# Patient Record
Sex: MALE | Race: BLACK OR AFRICAN AMERICAN | NOT HISPANIC OR LATINO | Employment: FULL TIME | RURAL
[De-identification: names, ages, dates, MRNs, and addresses within clinical notes are randomized per-mention and may not be internally consistent; named-entity substitution may affect disease eponyms.]

---

## 2017-11-21 ENCOUNTER — OFFICE VISIT (OUTPATIENT)
Dept: FAMILY MEDICINE CLINIC | Facility: CLINIC | Age: 42
End: 2017-11-21

## 2017-11-21 VITALS
DIASTOLIC BLOOD PRESSURE: 92 MMHG | HEART RATE: 93 BPM | TEMPERATURE: 98.6 F | BODY MASS INDEX: 26.69 KG/M2 | WEIGHT: 208 LBS | OXYGEN SATURATION: 97 % | HEIGHT: 74 IN | SYSTOLIC BLOOD PRESSURE: 158 MMHG | RESPIRATION RATE: 20 BRPM

## 2017-11-21 DIAGNOSIS — R03.0 ELEVATED BLOOD PRESSURE READING: Primary | ICD-10-CM

## 2017-11-21 DIAGNOSIS — Z80.0 FAMILY HISTORY OF COLON CANCER: ICD-10-CM

## 2017-11-21 PROCEDURE — 80053 COMPREHEN METABOLIC PANEL: CPT | Performed by: NURSE PRACTITIONER

## 2017-11-21 PROCEDURE — 36415 COLL VENOUS BLD VENIPUNCTURE: CPT | Performed by: NURSE PRACTITIONER

## 2017-11-21 PROCEDURE — 80061 LIPID PANEL: CPT | Performed by: NURSE PRACTITIONER

## 2017-11-21 PROCEDURE — 99213 OFFICE O/P EST LOW 20 MIN: CPT | Performed by: NURSE PRACTITIONER

## 2017-11-21 NOTE — PROGRESS NOTES
Subjective   Dylan Dickinson is a 42 y.o. male.     HPI Comments: Brought in by his wife to establish.  She has noted that occ his b/p will elevate.  So when he eats salty foods.  There is a family hx of heart disease and his mother had colon cancer.  He takes no meds and has not had labs drawn in some time.    Hypertension   This is a recurrent problem. The current episode started more than 1 month ago. The problem has been waxing and waning since onset. Pertinent negatives include no anxiety, blurred vision, chest pain, headaches, malaise/fatigue, neck pain, orthopnea, palpitations, peripheral edema, PND, shortness of breath or sweats. There are no associated agents to hypertension. Risk factors for coronary artery disease include family history, male gender and smoking/tobacco exposure. Past treatments include nothing. There is no history of angina, kidney disease, CAD/MI, CVA, heart failure, left ventricular hypertrophy, PVD or retinopathy.        The following portions of the patient's history were reviewed and updated as appropriate: allergies, current medications, past family history, past medical history, past social history, past surgical history and problem list.    Review of Systems   Constitutional: Negative.  Negative for malaise/fatigue.   HENT: Negative.    Eyes: Negative for blurred vision.   Respiratory: Negative.  Negative for shortness of breath.    Cardiovascular: Negative.  Negative for chest pain, palpitations, orthopnea and PND.   Musculoskeletal: Negative.  Negative for neck pain.   Skin: Negative.    Neurological: Negative.  Negative for headaches.   Psychiatric/Behavioral: Negative.        Objective   Physical Exam   Constitutional: He is oriented to person, place, and time. He appears well-developed and well-nourished. No distress.   HENT:   Head: Normocephalic.   Eyes: Pupils are equal, round, and reactive to light.   Neck: Normal range of motion. Neck supple. No thyromegaly present.    Cardiovascular: Normal rate, regular rhythm and normal heart sounds.  Exam reveals no friction rub.    No murmur heard.  Pulmonary/Chest: Effort normal and breath sounds normal. No respiratory distress. He has no wheezes. He has no rales.   Abdominal: Soft.   Musculoskeletal: Normal range of motion.   Neurological: He is alert and oriented to person, place, and time.   Skin: Skin is warm and dry.   Psychiatric: He has a normal mood and affect. Thought content normal.   Nursing note and vitals reviewed.      Assessment/Plan   Dylan was seen today for annual exam.    Diagnoses and all orders for this visit:    Elevated blood pressure reading  -     Comprehensive Metabolic Panel  -     Lipid panel    Family history of colon cancer  -     Ambulatory Referral to Gastroenterology    at present will just watch his diet.  They will monitor his b/p at home.  Referral and labs to be done.

## 2017-11-22 LAB
ALBUMIN SERPL-MCNC: 4.8 G/DL (ref 3.4–4.8)
ALBUMIN/GLOB SERPL: 1.7 G/DL (ref 1.1–1.8)
ALP SERPL-CCNC: 52 U/L (ref 38–126)
ALT SERPL W P-5'-P-CCNC: 30 U/L (ref 21–72)
ANION GAP SERPL CALCULATED.3IONS-SCNC: 14 MMOL/L (ref 5–15)
ARTICHOKE IGE QN: 163 MG/DL (ref 1–129)
AST SERPL-CCNC: 24 U/L (ref 17–59)
BILIRUB SERPL-MCNC: 0.5 MG/DL (ref 0.2–1.3)
BUN BLD-MCNC: 25 MG/DL (ref 7–21)
BUN/CREAT SERPL: 16.4 (ref 7–25)
CALCIUM SPEC-SCNC: 9.2 MG/DL (ref 8.4–10.2)
CHLORIDE SERPL-SCNC: 103 MMOL/L (ref 95–110)
CHOLEST SERPL-MCNC: 241 MG/DL (ref 0–199)
CO2 SERPL-SCNC: 21 MMOL/L (ref 22–31)
CREAT BLD-MCNC: 1.52 MG/DL (ref 0.7–1.3)
GFR SERPL CREATININE-BSD FRML MDRD: 62 ML/MIN/1.73 (ref 63–147)
GLOBULIN UR ELPH-MCNC: 2.9 GM/DL (ref 2.3–3.5)
GLUCOSE BLD-MCNC: 83 MG/DL (ref 60–100)
HDLC SERPL-MCNC: 57 MG/DL (ref 60–200)
LDLC/HDLC SERPL: 2.88 {RATIO} (ref 0–3.55)
POTASSIUM BLD-SCNC: 4.6 MMOL/L (ref 3.5–5.1)
PROT SERPL-MCNC: 7.7 G/DL (ref 6.3–8.6)
SODIUM BLD-SCNC: 138 MMOL/L (ref 137–145)
TRIGL SERPL-MCNC: 99 MG/DL (ref 20–199)

## 2017-11-27 ENCOUNTER — TELEPHONE (OUTPATIENT)
Dept: FAMILY MEDICINE CLINIC | Facility: CLINIC | Age: 42
End: 2017-11-27

## 2017-11-27 DIAGNOSIS — E78.2 MIXED HYPERLIPIDEMIA: Primary | ICD-10-CM

## 2017-12-12 RX ORDER — ATORVASTATIN CALCIUM 20 MG/1
20 TABLET, FILM COATED ORAL DAILY
Qty: 30 TABLET | Refills: 3 | Status: SHIPPED | OUTPATIENT
Start: 2017-12-12 | End: 2018-01-25 | Stop reason: SDUPTHER

## 2017-12-12 NOTE — TELEPHONE ENCOUNTER
Patient is aware of results and needs to stop any NSAID's and recheck his levels in 6 weeks.  Also aware needs to start Lipitor 20 mg for cholesterol and script sent to [facility] and regular f/u appt in 3 months

## 2018-01-22 ENCOUNTER — LAB (OUTPATIENT)
Dept: LAB | Facility: CLINIC | Age: 43
End: 2018-01-22

## 2018-01-22 DIAGNOSIS — E78.2 MIXED HYPERLIPIDEMIA: ICD-10-CM

## 2018-01-22 PROCEDURE — 80053 COMPREHEN METABOLIC PANEL: CPT | Performed by: NURSE PRACTITIONER

## 2018-01-22 PROCEDURE — 80061 LIPID PANEL: CPT | Performed by: NURSE PRACTITIONER

## 2018-01-22 PROCEDURE — 36415 COLL VENOUS BLD VENIPUNCTURE: CPT | Performed by: NURSE PRACTITIONER

## 2018-01-23 LAB
ALBUMIN SERPL-MCNC: 4.4 G/DL (ref 3.4–4.8)
ALBUMIN/GLOB SERPL: 1.6 G/DL (ref 1.1–1.8)
ALP SERPL-CCNC: 48 U/L (ref 38–126)
ALT SERPL W P-5'-P-CCNC: 30 U/L (ref 21–72)
ANION GAP SERPL CALCULATED.3IONS-SCNC: 10 MMOL/L (ref 5–15)
ARTICHOKE IGE QN: 160 MG/DL (ref 1–129)
AST SERPL-CCNC: 22 U/L (ref 17–59)
BILIRUB SERPL-MCNC: 0.9 MG/DL (ref 0.2–1.3)
BUN BLD-MCNC: 19 MG/DL (ref 7–21)
BUN/CREAT SERPL: 15.6 (ref 7–25)
CALCIUM SPEC-SCNC: 9.3 MG/DL (ref 8.4–10.2)
CHLORIDE SERPL-SCNC: 103 MMOL/L (ref 95–110)
CHOLEST SERPL-MCNC: 247 MG/DL (ref 0–199)
CO2 SERPL-SCNC: 23 MMOL/L (ref 22–31)
CREAT BLD-MCNC: 1.22 MG/DL (ref 0.7–1.3)
GFR SERPL CREATININE-BSD FRML MDRD: 80 ML/MIN/1.73 (ref 63–147)
GLOBULIN UR ELPH-MCNC: 2.8 GM/DL (ref 2.3–3.5)
GLUCOSE BLD-MCNC: 79 MG/DL (ref 60–100)
HDLC SERPL-MCNC: 61 MG/DL (ref 60–200)
LDLC/HDLC SERPL: 2.78 {RATIO} (ref 0–3.55)
POTASSIUM BLD-SCNC: 4.7 MMOL/L (ref 3.5–5.1)
PROT SERPL-MCNC: 7.2 G/DL (ref 6.3–8.6)
SODIUM BLD-SCNC: 136 MMOL/L (ref 137–145)
TRIGL SERPL-MCNC: 81 MG/DL (ref 20–199)

## 2018-01-25 ENCOUNTER — TELEPHONE (OUTPATIENT)
Dept: FAMILY MEDICINE CLINIC | Facility: CLINIC | Age: 43
End: 2018-01-25

## 2018-01-25 DIAGNOSIS — E78.2 MIXED HYPERLIPIDEMIA: ICD-10-CM

## 2018-01-25 RX ORDER — ATORVASTATIN CALCIUM 20 MG/1
20 TABLET, FILM COATED ORAL DAILY
Qty: 30 TABLET | Refills: 3 | Status: SHIPPED | OUTPATIENT
Start: 2018-01-25 | End: 2018-02-08 | Stop reason: SDUPTHER

## 2018-01-25 NOTE — TELEPHONE ENCOUNTER
Patient aware never started taking the atrovastatin.  Sent in new script and he will start taking.

## 2018-01-25 NOTE — TELEPHONE ENCOUNTER
----- Message from SHABANA Castellano sent at 1/25/2018  8:00 AM CST -----  Atorvastatin needs to be increased to 40mg and repeat in 6 months

## 2018-02-08 DIAGNOSIS — E78.2 MIXED HYPERLIPIDEMIA: ICD-10-CM

## 2018-02-08 RX ORDER — ATORVASTATIN CALCIUM 20 MG/1
20 TABLET, FILM COATED ORAL DAILY
Qty: 90 TABLET | Refills: 1 | Status: SHIPPED | OUTPATIENT
Start: 2018-02-08

## 2020-03-03 ENCOUNTER — OFFICE VISIT (OUTPATIENT)
Dept: FAMILY MEDICINE CLINIC | Facility: CLINIC | Age: 45
End: 2020-03-03

## 2020-03-03 VITALS
RESPIRATION RATE: 20 BRPM | DIASTOLIC BLOOD PRESSURE: 88 MMHG | HEART RATE: 67 BPM | BODY MASS INDEX: 23.81 KG/M2 | OXYGEN SATURATION: 98 % | HEIGHT: 74 IN | WEIGHT: 185.5 LBS | SYSTOLIC BLOOD PRESSURE: 118 MMHG | TEMPERATURE: 98.2 F

## 2020-03-03 DIAGNOSIS — M26.621 TMJ TENDERNESS, RIGHT: Primary | ICD-10-CM

## 2020-03-03 PROCEDURE — 99214 OFFICE O/P EST MOD 30 MIN: CPT | Performed by: NURSE PRACTITIONER

## 2020-03-03 PROCEDURE — 96372 THER/PROPH/DIAG INJ SC/IM: CPT | Performed by: NURSE PRACTITIONER

## 2020-03-03 RX ORDER — PANTOPRAZOLE SODIUM 40 MG/1
TABLET, DELAYED RELEASE ORAL
COMMUNITY
Start: 2020-02-05

## 2020-03-03 RX ORDER — IBUPROFEN 800 MG/1
800 TABLET ORAL EVERY 8 HOURS PRN
Qty: 30 TABLET | Refills: 0 | Status: SHIPPED | OUTPATIENT
Start: 2020-03-03 | End: 2020-03-13

## 2020-03-03 RX ORDER — KETOROLAC TROMETHAMINE 30 MG/ML
60 INJECTION, SOLUTION INTRAMUSCULAR; INTRAVENOUS ONCE
Status: COMPLETED | OUTPATIENT
Start: 2020-03-03 | End: 2020-03-03

## 2020-03-03 RX ORDER — KETOROLAC TROMETHAMINE 30 MG/ML
INJECTION, SOLUTION INTRAMUSCULAR; INTRAVENOUS
Status: CANCELLED | OUTPATIENT
Start: 2020-03-03

## 2020-03-03 RX ADMIN — KETOROLAC TROMETHAMINE 60 MG: 30 INJECTION, SOLUTION INTRAMUSCULAR; INTRAVENOUS at 11:00

## 2020-03-03 NOTE — PROGRESS NOTES
Chief Complaint   Patient presents with   • Earache   • Jaw Pain       Subjective:  Dylan Dickinson is a 45 y.o. male who presents for right ear and jaw pain that started yesterday. Denies ear drainage or known dental problems. Denies fever. Reports pain level 5/10 today. Has not tried tylenol or ibuprofen for pain control.       The following portions of the patient's history were reviewed and updated as appropriate: allergies, current medications, past family history, past medical history, past social history, past surgical history and problem list.    Earache    There is pain in the right ear. This is a new problem. The current episode started yesterday. The problem occurs constantly. The problem has been unchanged. There has been no fever. The pain is at a severity of 5/10. The pain is moderate. Pertinent negatives include no coughing, ear discharge, headaches, neck pain, rhinorrhea or sore throat. He has tried nothing for the symptoms. There is no history of a chronic ear infection.   Jaw Pain   This is a new problem. The current episode started yesterday. The problem occurs constantly. The problem has been unchanged. Associated symptoms include arthralgias. Pertinent negatives include no congestion, coughing, fever, headaches, myalgias, neck pain or sore throat. The symptoms are aggravated by eating (any movement that requires opening mouth). He has tried nothing for the symptoms.        History reviewed. No pertinent past medical history.      Current Outpatient Medications:   •  pantoprazole (PROTONIX) 40 MG EC tablet, , Disp: , Rfl:   •  atorvastatin (LIPITOR) 20 MG tablet, Take 1 tablet by mouth Daily., Disp: 90 tablet, Rfl: 1  •  ibuprofen (ADVIL,MOTRIN) 800 MG tablet, Take 1 tablet by mouth Every 8 (Eight) Hours As Needed for Mild Pain  or Moderate Pain  for up to 10 days., Disp: 30 tablet, Rfl: 0  No current facility-administered medications for this visit.     Review of Systems    Review of Systems  "  Constitutional: Negative for fever.   HENT: Positive for ear pain. Negative for congestion, ear discharge, rhinorrhea, sinus pressure, sinus pain, sore throat and trouble swallowing.    Respiratory: Negative for cough.    Musculoskeletal: Positive for arthralgias. Negative for myalgias and neck pain.        Right jaw pain   Neurological: Negative for headaches.       Objective  Vitals:    03/03/20 1019   BP: 118/88   BP Location: Left arm   Patient Position: Sitting   Cuff Size: Large Adult   Pulse: 67   Resp: 20   Temp: 98.2 °F (36.8 °C)   TempSrc: Oral   SpO2: 98%   Weight: 84.1 kg (185 lb 8 oz)   Height: 188 cm (74\")   PainSc:   5       Physical Exam   Constitutional: He is oriented to person, place, and time. He appears well-developed and well-nourished.   HENT:   Head: Normocephalic and atraumatic.   Right Ear: External ear normal.   Left Ear: External ear normal.   Nose: Nose normal.   Mouth/Throat: Oropharynx is clear and moist.   Eyes: Pupils are equal, round, and reactive to light.   Cardiovascular: Normal rate, regular rhythm and normal heart sounds.   Pulmonary/Chest: Effort normal and breath sounds normal.   Musculoskeletal: He exhibits tenderness (R TMJ area ttp; pain with opening mouth and clenching teeth).   Lymphadenopathy:     He has no cervical adenopathy.   Neurological: He is alert and oriented to person, place, and time.   Psychiatric: He has a normal mood and affect. His behavior is normal.   Nursing note and vitals reviewed.        Dylan was seen today for earache and jaw pain.    Diagnoses and all orders for this visit:    TMJ tenderness, right  -     ketorolac (TORADOL) injection 60 mg  -     ibuprofen (ADVIL,MOTRIN) 800 MG tablet; Take 1 tablet by mouth Every 8 (Eight) Hours As Needed for Mild Pain  or Moderate Pain  for up to 10 days.    1. Exam reassuring for R TMJ dysfunction.  2. Discussed using bite block at night if teeth grinding.  3. IM Ketorolac given today for " pain/inflammation  4. RX for ibuprofen submitted. Advised to take with food.  5. Advised f/u with PCP for continued sx or RTC PRN.      This document has been electronically signed by SHABANA Vazquez on March 3, 2020 11:05 AM

## 2020-03-03 NOTE — PATIENT INSTRUCTIONS
Jaw Range of Motion Exercises  Jaw range of motion exercises are exercises that help your jaw move better. Exercises that help you have good posture (postural exercises) also help relieve jaw discomfort. These are often done along with range of motion exercises. These exercises can help prevent or improve:  · Difficulty opening your mouth.  · Pain in your jaw while it is open or closed.  · Temporomandibular joint (TMJ) pain.  · Headache caused by jaw tension.  Take other actions to prevent or relieve jaw pain, such as:  · Avoiding things that cause or increase jaw pain. This may include:  ? Chewing gum or eating hard foods.  ? Clenching your jaw or teeth, grinding your teeth, or keeping tension in your jaw muscles.  ? Opening your mouth wide, such as for a big yawn.  ? Leaning on your jaw, such as resting your jaw in your hand while leaning on a desk.  · Putting ice on your jaw.  ? Put ice in a plastic bag.  ? Place a towel between your skin and the bag.  ? Leave the ice on for 10-15 minutes, 2-3 times a day.  Only do jaw exercises that your health care provider approves of. Only move your jaw as far as it can comfortably go in each direction. Do not move your jaw into positions that cause pain.  Range of motion exercises  Repeat each of these exercises 8 times, 1-2 times a day, or as told by your health care provider.  Exercise A: Forward protrusion  1. Push your jaw forward. Hold this position for 1-2 seconds.  2. Allow your jaw to return to its normal position and rest it there for 1-2 seconds.  Exercise B: Controlled opening  1. Stand or sit in front of a mirror. Place your tongue on the roof of your mouth, just behind your top teeth.  2. Keeping your tongue on the roof of your mouth, slowly open and close your mouth.  3. While you open and close your mouth, watch your jaw in the mirror. Try to keep your jaw from moving to one side or the other.  Exercise C: Right and left motion  1. Move your jaw right. Hold  "this position for 1-2 seconds. Allow your jaw to return to its normal position, and rest it there for 1-2 seconds.  2. Move your jaw left. Hold this position for 1-2 seconds. Allow your jaw to return to its normal position, and rest it there for 1-2 seconds.  Postural exercises  Exercise A: Chin tucks  1. You can do this exercise sitting, standing, or lying down.  2. Move your head straight back, keeping your head level. You can guide the movement by placing your fingers on your chin to push your jaw back in an even motion. You should be able to feel a double chin form at the end of the motion.  3. Hold this position for 5 seconds. Repeat 10-15 times.  Exercise B: Shoulder blade squeeze  1. Sit or stand.  2. Bend your elbows to about 90 degrees, which is the shape of a capital letter \"L.\" Keep your upper arms by your body.  3. Squeeze your shoulder blades down and back, as though you were trying to touch your elbows behind you. Do not shrug your shoulders or move your head.  4. Hold this position for 5 seconds. Repeat 10-15 times.  Exercise C: Chest stretch  1. Stand facing a corner.  2. Put both of your hands and your forearms on the wall, with your arms wide apart.  3. Make sure your arms are at a 90-degree angle to your body. This means that you should hold your arms straight out from your body, level with the floor.  4. Step in toward the corner. Do not lean in.  5. Hold this position for 30 seconds. Repeat 3 times.  Contact a health care provider if you have:  · Jaw pain that is new or gets worse.  · Clicking or popping sounds while doing the exercises.  Get help right away if:  · Your jaw is stuck in one place and you cannot move it.  · You cannot open or close your mouth.  This information is not intended to replace advice given to you by your health care provider. Make sure you discuss any questions you have with your health care provider.  Document Released: 11/30/2009 Document Revised: 11/14/2018 Document " Reviewed: 11/14/2018  [employer] Interactive Patient Education © 2020 [employer]  Temporomandibular Joint Syndrome    Temporomandibular joint syndrome (TMJ syndrome) is a condition that causes pain in the temporomandibular joints. These joints are located near your ears and allow your jaw to open and close. For people with TMJ syndrome, chewing, biting, or other movements of the jaw can be difficult or painful.  TMJ syndrome is often mild and goes away within a few weeks. However, sometimes the condition becomes a long-term (chronic) problem.  What are the causes?  This condition may be caused by:  · Grinding your teeth or clenching your jaw. Some people do this when they are under stress.  · Arthritis.  · Injury to the jaw.  · Head or neck injury.  · Teeth or dentures that are not aligned well.  In some cases, the cause of TMJ syndrome may not be known.  What are the signs or symptoms?  The most common symptom of this condition is an aching pain on the side of the head in the area of the TMJ. Other symptoms may include:  · Pain when moving your jaw, such as when chewing or biting.  · Being unable to open your jaw all the way.  · Making a clicking sound when you open your mouth.  · Headache.  · Earache.  · Neck or shoulder pain.  How is this diagnosed?  This condition may be diagnosed based on:  · Your symptoms and medical history.  · A physical exam. Your health care provider may check the range of motion of your jaw.  · Imaging tests, such as X-rays or an MRI.  You may also need to see your dentist, who will determine if your teeth and jaw are lined up correctly.  How is this treated?  TMJ syndrome often goes away on its own. If treatment is needed, the options may include:  · Eating soft foods and applying ice or heat.  · Medicines to relieve pain or inflammation.  · Medicines or massage to relax the muscles.  · A splint, bite plate, or mouthpiece to prevent teeth grinding or jaw clenching.  · Relaxation  techniques or counseling to help reduce stress.  · A therapy for pain in which an electrical current is applied to the nerves through the skin (transcutaneous electrical nerve stimulation).  · Acupuncture. This is sometimes helpful to relieve pain.  · Jaw surgery. This is rarely needed.  Follow these instructions at home:    Eating and drinking  · Eat a soft diet if you are having trouble chewing.  · Avoid foods that require a lot of chewing. Do not chew gum.  General instructions  · Take over-the-counter and prescription medicines only as told by your health care provider.  · If directed, put ice on the painful area.  ? Put ice in a plastic bag.  ? Place a towel between your skin and the bag.  ? Leave the ice on for 20 minutes, 2-3 times a day.  · Apply a warm, wet cloth (warm compress) to the painful area as directed.  · Massage your jaw area and do any jaw stretching exercises as told by your health care provider.  · If you were given a splint, bite plate, or mouthpiece, wear it as told by your health care provider.  · Keep all follow-up visits as told by your health care provider. This is important.  Contact a health care provider if:  · You are having trouble eating.  · You have new or worsening symptoms.  Get help right away if:  · Your jaw locks open or closed.  Summary  · Temporomandibular joint syndrome (TMJ syndrome) is a condition that causes pain in the temporomandibular joints. These joints are located near your ears and allow your jaw to open and close.  · TMJ syndrome is often mild and goes away within a few weeks. However, sometimes the condition becomes a long-term (chronic) problem.  · Symptoms include an aching pain on the side of the head in the area of the TMJ, pain when chewing or biting, and being unable to open your jaw all the way. You may also make a clicking sound when you open your mouth.  · TMJ syndrome often goes away on its own. If treatment is needed, it may include medicines to  relieve pain, reduce inflammation, or relax the muscles. A splint, bite plate, or mouthpiece may also be used to prevent teeth grinding or jaw clenching.  This information is not intended to replace advice given to you by your health care provider. Make sure you discuss any questions you have with your health care provider.  Document Released: 09/12/2002 Document Revised: 01/29/2019 Document Reviewed: 01/29/2019  [employer] Interactive Patient Education © 2020 [employer]